# Patient Record
Sex: MALE | Race: WHITE
[De-identification: names, ages, dates, MRNs, and addresses within clinical notes are randomized per-mention and may not be internally consistent; named-entity substitution may affect disease eponyms.]

---

## 2018-01-20 ENCOUNTER — HOSPITAL ENCOUNTER (EMERGENCY)
Dept: HOSPITAL 95 - ER | Age: 23
Discharge: HOME | End: 2018-01-20
Payer: COMMERCIAL

## 2018-01-20 VITALS — HEIGHT: 68 IN | BODY MASS INDEX: 22.73 KG/M2 | WEIGHT: 150 LBS

## 2018-01-20 DIAGNOSIS — S82.62XA: Primary | ICD-10-CM
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
## 2021-11-01 NOTE — NUR
PATIENT ARRIVED TO FLOOR FROM ED. CHEST TUBE TO RIGHT CHEST HOOKED TO -20 OF
SUCTION. AAOX4, ABLE TO MAKE NEEDS KNOWN. NO SIGNS OR SYMPTOMS ACUTE DISTRESS
NOTED. CALL LIGHT IN REACH.

## 2021-11-02 NOTE — NUR
SHIFT SUMMARY
PT A&O & IN PLEASENT MOOD T/O SHIFT. CHEST TUBE PLACED YESTERDAY, WATER SEALED
AND DRAINING SS FLUID. NEG 20 PRESSURE. PT DENIES SOB/CHEST PAIN T/O SHIFT.
PT C/O PAIN IN RIGHT SHOULDER BLADE T/O SHIFT, MEDICATED PER EMAR. VOIDING
WELL, TOLERATING PO INTAKE. VSS. LUNG SOUNDS DIMINISHED T/O. CALL LIGHT W/IN
REACH.

## 2021-11-02 NOTE — NUR
PATIENT CURRENTLY STANDING AT BEDSIDE WITH VISITORS IN ROOM. CHEST TUBE TO
RIGHT CHEST DRESSING IS CDI. CONTINUES TO BE AT -20 SUCTION. TOLERATING WELL.
NO SIGNS OR SYMPTOMS ACUTE DISTRESS NOTED. CALL LIGHT AND WATER IN EASY REACH.
ABLE TO MAKE NEEDS AND WANTS KNOWN. WILL MONITOR.

## 2021-11-02 NOTE — NUR
REPORT RECIEVED FROM NAEEM DAVIS. ASSUMED CARE OF PATIENT. PATIENT HAS NO SIGNS
OR SYMPTOMS ACUTE DISTRESS NOTED. NO COMPLAINTS OF RIGHT SHOULDER PAIN, HE
DOES COMPLAIN OF INCISIONAL/INSERTION AREA PAIN OF THE CHEST TUBE. DENIES NEED
FOR PAIN MED AT THIS TIME. CALL LIGHT AND WATER IN EASY REACH. ABLE TO MAKE
NEEDS AND WANTS KNOWN. AAO X 4. WILL MONITOR.

## 2021-11-03 NOTE — NUR
LYING IN SEMI FOWLERS WITH EYES CLOSED.  HAS RESTED WELL TONIGHT.  HAS
REQUESTED PAIN MEDS X1 THIS SHIFT.  RESPIRATIONS EVEN AND UNLABORED ON RA.
RIGHT CW 24FR CT IS PATENT, NO SUBCUE EMPHASEMA NOTED.  CONNECTED TO WATERSEAL
AND THEN TO CONT HIG SUCTION PRN MD ORDERS.  PT STATES ONLY PAIN IS A SMALL
BURNING STITCH WHEN HE BREATHS DEEP.  ABLE TO USE IS WELL.  CXRAY DONE THIS
MORNING, MD TO REVIEW RESULTS.  PT IS LOOKING FORWARD TO GOING HOME.  DENIES
FURTHER NEEDS OR WANTS AT THIS TIME.  SAFETY MEASURES IN PLACE.  WILL CONTINUE
TO MONITOR AND ADDRESS NEEDS AS THEY ARISE.  WILL GIVE HAND OFF TO ONCOMING
SHIFT USING SBAR DURING BEDSIDE REPORT.

## 2021-11-03 NOTE — NUR
PATIENT IS AAO X 4. ABLE TO MAKE NEEDS AND WANTS KNOWN. NO SIGNS OR SYMPTOMS
ACUTE DISTRESS NOTED. NO COMPLAINTS OF PAIN AT THIS TIME. PATIENT IS VISITING
WITH HIS WIFE AND MOTHER AND EATING PIZZA AT THIS TIME. PATIENT IS ABLE TO
MAKE NEEDS AND WANTS KNOWN. CALL LIGHT AND WATER IN EASY REACH. NO OUTPUT FROM
CHEST TUBE TODAY NOTED. CHEST TUBE TO -20MMHG SUCTION. NO COMPLAINTS OF
SHORTNESS OF BREATH. PATIENT WILL HAVE CHEST XRAY IN THE AM TO SEE PROGRESS.
WILL MONITOR.

## 2021-11-03 NOTE — NUR
PATIENT CURRENTLY STANDING UP IN ROOM WALKING AROUND WITH HIS CHEST TUBE
CANNISTER. NO SIGNS OR SYMPTOMS ACUTE DISTRESS NOTED AT THIS TIME. NO
COMPLAINTS OF SHORTNESS OF BREATH OR PAIN. TOLERATING CHEST TUBING BEING TO
WATER SEAL ONLY. DR LOWERY CAME IN THIS MORNING AND CHANGED HIM TO WATER SEAL.
A CHEST XRAY WILL BE DONE LATER TODAY TO DETERMINE HOW IS LUNGS LOOK AND
WHETHER OR NOT CHEST TUBE CAN BE REMOVED. WILL MONITOR PATIENT CLOSELY.

## 2021-11-04 NOTE — NUR
SUMMARY
DENIES ANY NEED FOR PAIN MEDS AT THIS TIME, PT STATES HE WOULD LIKE TO TAKE
HIS PAIN MEDS AT BEDTIME, DENIES ANY SOB, APPEARS TO BE IN NO DISTRESS, DR. LOWERY SAW PT THIS AFTERNOON, NO ACUTE CHANGES THIS SHIFT.

## 2021-11-04 NOTE — NUR
PT CONCERNED ABOUT NOT GETTING AN XRAY THIS AM AND ANXIOUS ABOUT DISCHARGE
PLAN AND INSURANCE COVERAGES, CARE MANAGER CAME IN AND DISCUSSED PT'S CONCERNS
ABOUT INCSURANCE, DR. LOWERY NOTIFIED REGARDING CONCERNS ABOUT GETTING AN
XRAY, PT TO STAY ON SUCTION TODAY AND WATER SEAL TOMORROW AND RECHECK XRAY
TOMORROW, PT NOTIFIED AND BECAME UPSET AND DOESN'T AGREE WITH THE PLAN, DR. LOWERY IS AWARE WILL SEE PT THIS AFTERNOON.

## 2021-11-05 NOTE — NUR
CHEST TUBE PLACED ON WATER SEAL PER DR. LOWERY, PT DENIES ANY SOB OR PAIN,
CONT. TO MONITOR FOR ANY CHANGES.